# Patient Record
Sex: MALE
[De-identification: names, ages, dates, MRNs, and addresses within clinical notes are randomized per-mention and may not be internally consistent; named-entity substitution may affect disease eponyms.]

---

## 2021-11-26 ENCOUNTER — TRANSCRIPTION ENCOUNTER (OUTPATIENT)
Age: 36
End: 2021-11-26

## 2023-03-14 ENCOUNTER — NON-APPOINTMENT (OUTPATIENT)
Age: 38
End: 2023-03-14

## 2023-03-23 PROBLEM — Z00.00 ENCOUNTER FOR PREVENTIVE HEALTH EXAMINATION: Status: ACTIVE | Noted: 2023-03-23

## 2023-03-24 ENCOUNTER — APPOINTMENT (OUTPATIENT)
Dept: OTOLARYNGOLOGY | Facility: CLINIC | Age: 38
End: 2023-03-24
Payer: COMMERCIAL

## 2023-03-24 VITALS — WEIGHT: 172 LBS | HEIGHT: 71 IN | BODY MASS INDEX: 24.08 KG/M2

## 2023-03-24 DIAGNOSIS — Z78.9 OTHER SPECIFIED HEALTH STATUS: ICD-10-CM

## 2023-03-24 DIAGNOSIS — Z80.9 FAMILY HISTORY OF MALIGNANT NEOPLASM, UNSPECIFIED: ICD-10-CM

## 2023-03-24 PROCEDURE — 31231 NASAL ENDOSCOPY DX: CPT

## 2023-03-24 PROCEDURE — 99204 OFFICE O/P NEW MOD 45 MIN: CPT | Mod: 25

## 2023-03-24 NOTE — HISTORY OF PRESENT ILLNESS
[de-identified] : ROGER STOVER is a 37 year man with a history of having right upper molar extracted August 2022. 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

## 2023-03-24 NOTE — ASSESSMENT
[FreeTextEntry1] : ROGER STOVER right oral antral fistular. Dentist attempted bone graft and suturing. I will send him for CT.

## 2023-03-24 NOTE — REVIEW OF SYSTEMS
[Negative] : Heme/Lymph [Patient Intake Form Reviewed] : Patient intake form was reviewed [de-identified] : pressure

## 2023-03-27 ENCOUNTER — TRANSCRIPTION ENCOUNTER (OUTPATIENT)
Age: 38
End: 2023-03-27

## 2023-03-31 ENCOUNTER — APPOINTMENT (OUTPATIENT)
Dept: CT IMAGING | Facility: CLINIC | Age: 38
End: 2023-03-31
Payer: COMMERCIAL

## 2023-03-31 ENCOUNTER — OUTPATIENT (OUTPATIENT)
Dept: OUTPATIENT SERVICES | Facility: HOSPITAL | Age: 38
LOS: 1 days | End: 2023-03-31

## 2023-03-31 PROCEDURE — 70486 CT MAXILLOFACIAL W/O DYE: CPT | Mod: 26

## 2023-04-14 ENCOUNTER — APPOINTMENT (OUTPATIENT)
Dept: OTOLARYNGOLOGY | Facility: CLINIC | Age: 38
End: 2023-04-14
Payer: COMMERCIAL

## 2023-04-14 VITALS — WEIGHT: 172 LBS | BODY MASS INDEX: 24.08 KG/M2 | HEIGHT: 71 IN

## 2023-04-14 PROCEDURE — 99213 OFFICE O/P EST LOW 20 MIN: CPT

## 2023-04-14 NOTE — ASSESSMENT
[FreeTextEntry1] : ROGER STOVER has right oral antral fistular following upper molar extraction 7-8 months ago. I explained that relieving the sinus outflow obstruction usually allows the sinus fistula to heal. I told him that secondary closure might be necessary after treatment. I suggested right maxillary sinus balloon sinuplasty in the office.

## 2023-04-14 NOTE — CONSULT LETTER
[Dear  ___] : Dear  [unfilled], [Consult Letter:] : I had the pleasure of evaluating your patient, [unfilled]. [Please see my note below.] : Please see my note below. [Sincerely,] : Sincerely, [FreeTextEntry3] : Sheng Rae MD\par

## 2023-04-14 NOTE — HISTORY OF PRESENT ILLNESS
[de-identified] : ROGER STOVER is a 37 year man with a history of OA fistula following upper right molar extraction in August. CT show  Oral antral fistula, sinus inflammation and omu obstruction.

## 2023-05-09 ENCOUNTER — APPOINTMENT (OUTPATIENT)
Dept: OTOLARYNGOLOGY | Facility: CLINIC | Age: 38
End: 2023-05-09
Payer: COMMERCIAL

## 2023-05-09 DIAGNOSIS — K04.6 PERIAPICAL ABSCESS WITH SINUS: ICD-10-CM

## 2023-05-09 PROCEDURE — 31295Z: CUSTOM

## 2023-05-09 RX ORDER — FLUTICASONE PROPIONATE 50 UG/1
50 SPRAY, METERED NASAL DAILY
Qty: 1 | Refills: 2 | Status: ACTIVE | COMMUNITY
Start: 2023-05-09 | End: 1900-01-01

## 2023-05-19 ENCOUNTER — APPOINTMENT (OUTPATIENT)
Dept: OTOLARYNGOLOGY | Facility: CLINIC | Age: 38
End: 2023-05-19
Payer: COMMERCIAL

## 2023-05-19 VITALS — BODY MASS INDEX: 24.08 KG/M2 | WEIGHT: 172 LBS | HEIGHT: 71 IN

## 2023-05-19 PROCEDURE — 31231 NASAL ENDOSCOPY DX: CPT

## 2023-05-19 RX ORDER — AMOXICILLIN AND CLAVULANATE POTASSIUM 875; 125 MG/1; MG/1
875-125 TABLET, COATED ORAL TWICE DAILY
Qty: 20 | Refills: 0 | Status: COMPLETED | COMMUNITY
Start: 2023-05-05 | End: 2023-05-19

## 2023-05-19 RX ORDER — PREDNISONE 10 MG/1
10 TABLET ORAL
Qty: 12 | Refills: 0 | Status: COMPLETED | COMMUNITY
Start: 2023-05-05 | End: 2023-05-19

## 2023-05-19 RX ORDER — AMOXICILLIN AND CLAVULANATE POTASSIUM 875; 125 MG/1; MG/1
875-125 TABLET, COATED ORAL
Qty: 20 | Refills: 0 | Status: ACTIVE | COMMUNITY
Start: 2023-05-19 | End: 1900-01-01

## 2023-05-19 NOTE — HISTORY OF PRESENT ILLNESS
[de-identified] : ROGER STOVER is a 38 year man with a history of OA fistula one week s/p maxillary sinus balloon sinuplasty. He feels things are improving. He had had air coming from extraction site but none after wards.

## 2023-05-19 NOTE — ASSESSMENT
[FreeTextEntry1] : ROGER STOVER is feeling better. He had drainage from right OMU that I cultured and suctioned. He will continue Augmentin RTC 2 weeks

## 2023-05-25 LAB — EAR NOSE AND THROAT CULTURE: ABNORMAL

## 2023-05-30 ENCOUNTER — APPOINTMENT (OUTPATIENT)
Dept: OTOLARYNGOLOGY | Facility: CLINIC | Age: 38
End: 2023-05-30
Payer: COMMERCIAL

## 2023-05-30 DIAGNOSIS — J32.9 CHRONIC SINUSITIS, UNSPECIFIED: ICD-10-CM

## 2023-05-30 PROCEDURE — 31231 NASAL ENDOSCOPY DX: CPT

## 2023-06-02 NOTE — ASSESSMENT
[FreeTextEntry1] : ROGER STOVER is 3 weeks s/p right maxillary sinus balloon sinuplasty. I suggest sinus CT in 3 weeks.

## 2023-06-02 NOTE — HISTORY OF PRESENT ILLNESS
[de-identified] : ROGER STOVER is a 38 year man with a history of right OA fistula. He feels well. He feels it is healing.

## 2023-06-26 ENCOUNTER — APPOINTMENT (OUTPATIENT)
Dept: CT IMAGING | Facility: CLINIC | Age: 38
End: 2023-06-26
Payer: COMMERCIAL

## 2023-06-26 ENCOUNTER — OUTPATIENT (OUTPATIENT)
Dept: OUTPATIENT SERVICES | Facility: HOSPITAL | Age: 38
LOS: 1 days | End: 2023-06-26

## 2023-06-26 PROCEDURE — 70486 CT MAXILLOFACIAL W/O DYE: CPT | Mod: 26
